# Patient Record
Sex: MALE | Race: WHITE
[De-identification: names, ages, dates, MRNs, and addresses within clinical notes are randomized per-mention and may not be internally consistent; named-entity substitution may affect disease eponyms.]

---

## 2017-11-29 ENCOUNTER — HOSPITAL ENCOUNTER (EMERGENCY)
Dept: HOSPITAL 24 - ER | Age: 72
Discharge: HOME | End: 2017-11-29
Payer: COMMERCIAL

## 2017-11-29 VITALS — SYSTOLIC BLOOD PRESSURE: 140 MMHG | DIASTOLIC BLOOD PRESSURE: 72 MMHG

## 2017-11-29 VITALS — BODY MASS INDEX: 28.1 KG/M2

## 2017-11-29 DIAGNOSIS — R10.32: ICD-10-CM

## 2017-11-29 DIAGNOSIS — N13.30: ICD-10-CM

## 2017-11-29 DIAGNOSIS — N20.0: Primary | ICD-10-CM

## 2017-11-29 LAB
BUN SERPL-MCNC: 28 MG/DL (ref 7–18)
CALCIUM ALBUM COR SERPL-SCNC: 142 MMOL/L (ref 136–145)
CALCIUM SERPL-MCNC: 9.3 MG/DL (ref 8.5–10.1)
CHLORIDE SERPL-SCNC: 108 MMOL/L (ref 98–107)
CO2 SERPL-SCNC: 23.3 MMOL/L (ref 21–32)
CREAT SERPL-MCNC: 1.14 MG/DL (ref 0.7–1.3)
EGFR  BLACK RACES: > 60 (ref 60–?)
SODIUM SERPL-SCNC: 141 MMOL/L (ref 136–145)

## 2017-11-29 PROCEDURE — 36415 COLL VENOUS BLD VENIPUNCTURE: CPT

## 2017-11-29 PROCEDURE — 96365 THER/PROPH/DIAG IV INF INIT: CPT

## 2017-11-29 PROCEDURE — 99283 EMERGENCY DEPT VISIT LOW MDM: CPT

## 2017-11-29 PROCEDURE — 80048 BASIC METABOLIC PNL TOTAL CA: CPT

## 2017-11-29 PROCEDURE — 96367 TX/PROPH/DG ADDL SEQ IV INF: CPT

## 2017-11-29 PROCEDURE — 96375 TX/PRO/DX INJ NEW DRUG ADDON: CPT

## 2017-11-29 PROCEDURE — 74176 CT ABD & PELVIS W/O CONTRAST: CPT

## 2017-11-29 PROCEDURE — 96374 THER/PROPH/DIAG INJ IV PUSH: CPT

## 2017-11-29 NOTE — CT
CT abdomen and pelvis without contrast



Indication: Left flank pain



Comparison: None available



Technique:



Multiple axial images of the abdomen and pelvis were obtained from the lung bases to the pubic symphy
sis without the administration of IV contrast.  



Findings:

The lung bases are clear. Given limitations of a noncontrast examination no focal hepatic lesion is i
dentified. The gallbladder, bile ducts, spleen, pancreas and right adrenal gland are unremarkable. Le
ft adrenal gland contains an approximate 13 mm nodule seen on coronal image 28 consistent adenoma bas
ed on Hounsfield attenuation. The right kidney demonstrates small round hypoattenuating lesion within
 the inferior pole which is consistent with a cyst based on Hounsfield attenuation. The left kidney d
emonstrates mild hydronephrosis secondary to an approximate 3 mm stone impacted within the left UVJ o
n axial image 75. Multiple small nonobstructing stones are noted within the mid and lower pole of the
 left kidney. Urinary bladder is unremarkable. Prostate gland is normal. The rectum is normal. Scatte
red diverticular noted within the distal colon without evidence of acute diverticulitis. Upper GI tra
ct demonstrates a small sliding hiatal hernia with distal esophageal thickening. Abdominal aorta is n
ormal in caliber with scattered calcified atherosclerotic disease. No enlarged abdominal or pelvic ly
mphadenopathy. Review of bone windows demonstrates no acute osseous abnormality.



IMPRESSION:

1. An approximate 3 mm stone impacted within the left UVJ causes mild left hydronephrosis.



2. Several nonobstructing stones within mid and lower pole left kidney.



3. Small left adrenal gland adenoma.



4. Distal colonic diverticulosis without evidence of acute diverticulitis.



5. Small sliding hiatal hernia with distal esophageal thickening suggesting esophagitis in setting of
 gastroesophageal reflux disease, clinical correlation is needed and potentially endoscopy based on yessy Restrepo or metaplasia or dysplasia.







Reported By:Electronically Signed by TEN NORRIS MD at 11/29/2017 2:59:26 AM

## 2017-11-29 NOTE — DR.ABDMALE
HPI





- Time seen


Time seen: 02:28





- PCP


Primary Care Physician: BRANDON





- Complaint


Chief Complaint Doctors Comments: Lt. flank and LLQ pain since night of 11/2/ 17. He can't charactize the pain and states that it just hurts.  He has some 

dysuria now but no fruquency/urgency. He has nausea.


Chief Complaint:: "IT ALL STARTED LAST NIGHT, I WAS HAVING KIDNEY TROUBLE, I AM 

BURNING WHEN I PEE. MY BELLY GOT DISTENDED, I COULDN'T BELCH OR ANYTHING. I HAD 

3 BOWEL MOVEMENTS,NORMAL. MOST OF THE PAIN IS ON THE LEFT OF MY BACK AND COMES 

AROUND."


Self Treatment fo Chief Complaint: NYQUIL





- Reviewed


Nurses Notes Review: Yes





- Mode of arrival


Mode of Arrival: Ambulatory





- Timing


Onset of Chief Complaint: 11/28/17





- Severity


Severity: Moderate





- Context


Onset: At Rest


History of: None





- Modifying factors


Worsening Factors: Nothing


Improving Factors: Nothing





- Associated signs and symptoms


Associated Signs and Symptoms: Nausea





PMH





- PMH


Past Medical History: Yes


Past Medical History: Dyslipidemia, Hypertension


Past Medical History Comment: PROSTATE


Past Surgical History: Yes


Surgical History: Ortho Surgery


Past Surgical History Comment: RT KNEE





- Family History


History of Family Medical Conditions: No





- Social History


Does patient currently use any type of tobacco product: No


Have you used tobacco products in the last 12 months: No


Type of Tobacco Use: None


Does any household member use tobacco: No


Alcohol Use: None


Do you use any recreational Drugs:: No


Lives With: Spouse


Lives Where: Home





- infectious screening


Have you traveled outside the country in the last 6 months?: No


Isolation: Standard





ROS





- Review of Systems


Constitutional: No Symptoms Reported


Eyes: No Symptoms Reported


ENTM: No Symptoms Reported


Respiratoy: No Symptoms Reported


Cardiovascular: No Symptoms Reported


Gastrointestinal/Abdominal: Abdominal Pain, Nausea


Genitourinary: No Symptoms Reported


Neurological: No Symptoms Reported


Musculoskeletal: No Symptoms Reported


Integumentary: No Symptoms Reported


Hematologic/Lymphatic: No Symptoms Reported


Endocrine: No Symptoms Reported


Psychiatric: No Symptoms Reported





PE





- Vital Signs


Vital Signs: 


 











  Pulse Resp BP Pulse Ox


 


 11/29/17 02:44  70  16  168/79  93 L














- General


Limitations: No Limitations


General Appearance: Alert, In No Apparent Distress





- Head


Head Exam: Normal Inspection





- Eyes


Eye exam: Normal Appearance





- ENT


ENT Exam: Normal Exam





- Neck


Neck Exam: Normal Inspection, Full ROM, Trachea Midline





- Chest


Chest Inspection: Normal Inspection





- Respiratory


Respiratory Exam: Normal Lung Sounds Bilat





- Cardiovascular


Cardiovascular Exam: Regular Rate, Normal Rhythm





- Abdominal Exam


Abdominal Exam: Normal Inspection, Normal Bowel Sounds, Soft, Tenderness, 

Rigidity.  negative: Distention, Guarding, Rebound, Dimnished Bowel Sounds, 

Hyperactive Bowel Sounds, Hypoactive Bowel Sounds, Organomegaly, Trauma, 

Incision, Ascites, Mass, Bruit, Pulsatile Mass, Hernia, Other


Abdominal Tenderness: LLQ





- Back


Back Exam: Normal Inspection





- Extremeties


Extremities Exam: Normal Inspection





- Neurologic


Neurological Exam: Alert, Oriented X3, CN II-XII Intact





- Psychiatric


Psychiatric Exam: Normal Affect, Normal Mood





- Skin


Skin Exam: Warm, Dry, Intact, Normal Color





Course





- Reevaluation


1st: Improved





- Education/Counseling


Education/Counseling: Patient, Family, Education


Educated On: Treatment, Diagnosis, Prognosis, Needs for Follow Up (f/u with yur 

PCP to obtain a Urology referral )





ROR





- XRAY


XRAY Interpreted by: Radiologist (An approximate 3 mm stone impacted within the 

leftUVJcausing mild left hydronephrosis.)





- Diagnosis


Discharge Problem: 


 Kidney stone on left side, Hydronephrosis








- Discharge Plan


Disposition: 01 HOME, SELF-CARE


Condition: Stable





- Follow ups/Referrals


Follow ups/Referrals: 


Lambert Friedman [Primary Care Provider] - 3 days





- Instructions

## 2018-02-01 ENCOUNTER — HOSPITAL ENCOUNTER (OUTPATIENT)
Dept: HOSPITAL 24 - SURG1 | Age: 73
Discharge: HOME | End: 2018-02-01
Attending: INTERNAL MEDICINE
Payer: COMMERCIAL

## 2018-02-01 VITALS — SYSTOLIC BLOOD PRESSURE: 160 MMHG | DIASTOLIC BLOOD PRESSURE: 80 MMHG

## 2018-02-01 DIAGNOSIS — Z12.11: Primary | ICD-10-CM

## 2018-02-01 DIAGNOSIS — Z80.0: ICD-10-CM

## 2018-02-01 DIAGNOSIS — K64.0: ICD-10-CM

## 2018-02-01 DIAGNOSIS — K57.30: ICD-10-CM

## 2018-02-01 PROCEDURE — 99100 ANES PT EXTEME AGE<1 YR&>70: CPT

## 2018-02-01 PROCEDURE — 0DJD8ZZ INSPECTION OF LOWER INTESTINAL TRACT, VIA NATURAL OR ARTIFICIAL OPENING ENDOSCOPIC: ICD-10-PCS | Performed by: INTERNAL MEDICINE

## 2018-02-01 PROCEDURE — A4217 STERILE WATER/SALINE, 500 ML: HCPCS
